# Patient Record
Sex: FEMALE | Race: OTHER | HISPANIC OR LATINO | URBAN - METROPOLITAN AREA
[De-identification: names, ages, dates, MRNs, and addresses within clinical notes are randomized per-mention and may not be internally consistent; named-entity substitution may affect disease eponyms.]

---

## 2019-11-04 ENCOUNTER — EMERGENCY (EMERGENCY)
Facility: HOSPITAL | Age: 19
LOS: 1 days | Discharge: HOME | End: 2019-11-04
Attending: EMERGENCY MEDICINE
Payer: COMMERCIAL

## 2019-11-04 VITALS
HEART RATE: 82 BPM | SYSTOLIC BLOOD PRESSURE: 103 MMHG | OXYGEN SATURATION: 100 % | HEIGHT: 64 IN | RESPIRATION RATE: 20 BRPM | DIASTOLIC BLOOD PRESSURE: 70 MMHG | WEIGHT: 110.01 LBS | TEMPERATURE: 99 F

## 2019-11-04 PROCEDURE — 99283 EMERGENCY DEPT VISIT LOW MDM: CPT

## 2019-11-04 RX ORDER — IBUPROFEN 200 MG
600 TABLET ORAL ONCE
Refills: 0 | Status: COMPLETED | OUTPATIENT
Start: 2019-11-04 | End: 2019-11-04

## 2019-11-04 RX ORDER — PSEUDOEPHEDRINE HCL 30 MG
60 TABLET ORAL ONCE
Refills: 0 | Status: COMPLETED | OUTPATIENT
Start: 2019-11-04 | End: 2019-11-04

## 2019-11-04 RX ADMIN — Medication 600 MILLIGRAM(S): at 13:19

## 2019-11-04 RX ADMIN — Medication 60 MILLIGRAM(S): at 13:19

## 2019-11-04 NOTE — ED ADULT NURSE NOTE - NSIMPLEMENTINTERV_GEN_ALL_ED
Implemented All Universal Safety Interventions:  [place] to call system. Call bell, personal items and telephone within reach. Instruct patient to call for assistance. Room bathroom lighting operational. Non-slip footwear when patient is off stretcher. Physically safe environment: no spills, clutter or unnecessary equipment. Stretcher in lowest position, wheels locked, appropriate side rails in place.

## 2019-11-04 NOTE — ED ADULT NURSE NOTE - OBJECTIVE STATEMENT
pt is here for sore throat.  pt stated that sore throat pain for 2 days, denied fever or chills, denied sob or cough, pt calm at this time.

## 2019-11-04 NOTE — ED PROVIDER NOTE - PATIENT PORTAL LINK FT
You can access the FollowMyHealth Patient Portal offered by [facility] by registering at the following website: [email or web address]. By joining [facility]’s FollowMyHealth portal, you will also be able to view your health information using other applications (apps) compatible with our system.

## 2019-11-04 NOTE — ED PROVIDER NOTE - OBJECTIVE STATEMENT
20 y/o F patient w/ no significant PMHx and no significant PSHx presents to the ED with sore throat for x4 days. Patient also states she is having nasal congestion. Patient says she had a subjective fever x3 days ago that went away. patient adds she took Tylenol for her symptoms. Patient reports she feels as if her symptoms aren't exacerbating or feeling any better. Patient denies any other complaints. NKDA.

## 2019-11-04 NOTE — ED PROVIDER NOTE - CLINICAL SUMMARY MEDICAL DECISION MAKING FREE TEXT BOX
Patient with throat pain and nasal congestion. Likely viral symptoms. Educate on symptomatic treatment and d/c home.

## 2024-08-29 NOTE — ED PROVIDER NOTE - PMH
Physical Therapy    Visit Type: initial evaluation    Relevant History/Co-morbidities: [place] - fall 8/20/2024 with subsequent R hip pain    SUBJECTIVE  Patient agreed to participate in therapy this date.  Patient verbally agrees to allow the following to be present during session: daughter  I fell last week, tripped onto her right side, reports pain every since that has been progressively worsening.  Patient reports difficulty walking, when putting weight on her leg.  Patient / Family Goal: return to previous functional status    Pain     Location: R hip /anterior/lateral    At onset of session (out of 10): 9     OBJECTIVE     Cognitive Status   Level of Consciousness   - alert  Arousal Alertness   - appropriate responses to stimuli  Affect/Behavior    - calm and cooperative  Orientation    - Oriented to: person, place, time and situation  Functional Communication   - Overall Communication Status: within functional limits   - Forms of Communication: verbal  Attention Span    - Attention: intact  Following Direction   - follows all commands and directions consistently     Range of Motion (ROM)   (degrees unless noted; active unless noted; norms in ( ); negative=lacking to 0, positive=beyond 0)  WFL: LLE  Hip:   - Flexion (100-120):       Right:  pain    - Extension (20-30):       Right:   pain   - ABDuction (40):       Right:   pain   -  ADDuction (20):       Right:   pain  Knee:   - Flexion (150):       Right:   pain   - Extension (0-10):       Right:   pain  Ankle:   - Dorsiflexion (20):       Right:   WNL    - Plantar Flexion (45-50):       Right:   WNL  Comments: Patient with pain with all range of motion, but no changes with lumbar ROM, isolated to right hip movement    Strength  (out of 5 unless noted, standard test position unless noted)   WFL: LLE  Hip:    - Flexion:         Right: 3+, pain    - Abduction:         Right: 3+, pain    - Adduction:         Right: 3+, pain    - Internal Rotation:          Right: pain    - External Rotation:         Right: pain  Knee:    - Flexion:         Right: 4+    - Extension:         Right: 4+, pain  Ankle:    - Dorsiflexion:         Left: 5         Right: 5      Sitting Balance  (MARTINEZ = base of support)  Static      - Trial 1 details: independent    Standing Balance  (MARTINEZ = base of support)  Firm Surface: Double Leg      - Static, Eyes Open       - Trial 1 details: supervision and with double UE support       Bed Mobility  - Rolling left: independent  - Rolling right: independent  - Supine to sit: independent  - Sit to supine: independent  Transfers  Assistive devices: 1 person, gait belt  - Sit to stand: independent  - Stand to sit: independent    Ambulation / Gait  - Assistive device: gait belt, 1 person and two-wheeled walker  - Distance (feet unless otherwise indicated): 50  - Assist Level: modified independent  Patient ambulated with and without devices  Patient with severely antalgic gait, improved with use of straight cane, patient then with improved mobility with use of rolling walker.  She has more normalized stance time and ability to perform toe off phase.  Patient educated to use rolling walker to improve gait to avoid compensatory injuries as a result (due to hip hiking/wt shifting difficulties and increased hip external rotation)     Interventions     Training provided: activity tolerance, balance retraining, bed mobility training, transfer training, safety training and gait training  Access Code: [email or web address]  URL: [email or web address]  Date: 08/29/2024  Prepared by: Audra Anders    Exercises  - Supine Bridge  - 1 x daily - 7 x weekly - 3 sets - 10 reps  - Supine Heel Slide  - 1 x daily - 7 x weekly - 3 sets - 10 reps  - Bent Knee Fallouts  - 1 x daily - 7 x weekly - 3 sets - 10 reps  - Supine Hip Abduction  - 1 x daily - 7 x weekly - 3 sets - 10 reps    Patient Education  - Trochanteric Bursitis  Skilled input: Verbal  instruction/cues  Verbal Consent: Writer verbally educated and received verbal consent for hand placement, positioning of patient, and techniques to be performed today from patient for clothing adjustments for techniques and therapist position for techniques as described above and how they are pertinent to the patient's plan of care.         ASSESSMENT   Interferring components: decreased activity tolerance    Discharge needs based on today's assessment:   - Current level of function: at baseline level of function   - Therapy needs at discharge: outpatient therapy 1-3 times per week   - Impairments that require further therapy intervention: pain, strength, ROM and balance    Patient will benefit from service to physical therapy order and initiate outpatient for above impairments.  Patient with limited hip range of motion, impaired strength right lower extremity due to pain, soft tissue mobility issues with ITB, tenderness to palpation at right trochanter, impaired functional mobility and pain which limits mobility, provided home exercise program on evaluation and issued rolling walker due to significant gait compensations, patient tolerated well.  Educated to sleep with pillow between knees and avoid excessive strain to right lower extremity .    AM-PAC  - Generalized Prior Level of Function: IND/MOD I ([facility] 22-24)       Key: MOD A=moderate assistance, IND/MOD I=independent/modified independent  - Generalized Current Level of Function     - Current Mobility Score: 23       AM-PAC Scoring Key= >21 Modified Independent; 20-21 Supervision; 18-19 Minimal assist; 13-17 Moderate assist; 9-12 Max assist; <9 Total assist        Predicted patient presentation: Low (stable) - Patient comorbidities and complexities, as defined above, will have little effect on progress for prescribed plan of care.    PLAN (while hospitalized)  Suggestions for next session as indicated:   PT Frequency: DC PT      PT/OT Mobility Equipment for  Discharge: issued RW  Interventions: balance, bed mobility, stairs retraining, modalities, functional transfer training, ROM, gait training and neuromuscular re-education        GOALS  Long Term Goals: (to be met by time of discharge from hospital)  State precautions: Patient able to state precautions independent.  Status: met   Maintain precautions: Patient able to maintain precautions independent.  Status: met   Sit to supine: Patient will complete sit to supine modified independent.  Status: met   Supine to sit: Patient will complete supine to sit modified independent.  Status: met   Reposition in bed: Patient will complete repositioning in bed modified independent.  Status: met   Sit (edge of bed): Patient will sit at edge of bed for modified independent.   Status: met   Stand (even surface): Patient will stand on even surface for modified independent.   Status: met   Sit to stand: Patient will complete sit to stand transfer with gait belt, modified independent.   Status: met   Stand to sit: Patient will complete stand to sit transfer with gait belt, modified independent.   Status: met   Ambulation (even): Patient will ambulate on even surface for 150 feet with gait belt, modified independent.   Status: met   3-4 steps: Patient will ambulate 3-4 steps with gait belt using one rail, modified independent.   Status: met   Documented in the chart in the following areas:  Services. Assessment/Plan.      Patient at End of Session:   Location: on cart  Safety measures: bed rails x2 and call light within reach  Handoff to: nurse (MD/PA)      Therapy procedure time and total treatment time can be found documented on the Time Entry flowsheet   No pertinent past medical history <<----- Click to add NO pertinent Past Medical History